# Patient Record
Sex: MALE | Race: BLACK OR AFRICAN AMERICAN | NOT HISPANIC OR LATINO | URBAN - METROPOLITAN AREA
[De-identification: names, ages, dates, MRNs, and addresses within clinical notes are randomized per-mention and may not be internally consistent; named-entity substitution may affect disease eponyms.]

---

## 2019-04-11 ENCOUNTER — EMERGENCY (EMERGENCY)
Facility: HOSPITAL | Age: 24
LOS: 1 days | Discharge: HOME | End: 2019-04-11
Admitting: EMERGENCY MEDICINE
Payer: COMMERCIAL

## 2019-04-11 VITALS
HEART RATE: 76 BPM | RESPIRATION RATE: 18 BRPM | WEIGHT: 244.93 LBS | DIASTOLIC BLOOD PRESSURE: 86 MMHG | SYSTOLIC BLOOD PRESSURE: 144 MMHG | OXYGEN SATURATION: 98 % | TEMPERATURE: 98 F | HEIGHT: 73 IN

## 2019-04-11 PROCEDURE — 73610 X-RAY EXAM OF ANKLE: CPT | Mod: 26,LT

## 2019-04-11 PROCEDURE — 99283 EMERGENCY DEPT VISIT LOW MDM: CPT

## 2019-04-11 PROCEDURE — 99283 EMERGENCY DEPT VISIT LOW MDM: CPT | Mod: 25

## 2019-04-11 PROCEDURE — 73610 X-RAY EXAM OF ANKLE: CPT

## 2019-04-11 NOTE — ED ADULT NURSE NOTE - OBJECTIVE STATEMENT
PT is 23y male presented to ED w/ L ankle pain and swelling s/p tripping and falling over another persons foot while playing basketball.

## 2019-04-11 NOTE — ED ADULT NURSE NOTE - CHPI ED NUR SYMPTOMS NEG
no fever/no deformity/no tingling/no vomiting/no weakness/no abrasion/no numbness/no bleeding/no loss of consciousness

## 2019-04-11 NOTE — ED PROVIDER NOTE - OBJECTIVE STATEMENT
left ankle injury with pain and swelling tonight as landed on another persons foot playing ball and thus to ED -> denies other injury

## 2019-04-11 NOTE — ED PROVIDER NOTE - PHYSICAL EXAMINATION
left ankle with lateral malleolar tenderness , ROM limted by tenderness skin intact compartments soft pulses intact cap refill brisk

## 2019-04-11 NOTE — ED PROVIDER NOTE - DIAGNOSTIC INTERPRETATION
well corticated bony island post lat to talus obscuring possible small distal fib ND Garsia A but in essence Neg for acute fx or dislocation

## 2019-04-11 NOTE — ED PROVIDER NOTE - CARE PLAN
Principal Discharge DX:	Ankle pain, left Principal Discharge DX:	Ankle pain, left  Secondary Diagnosis:	Ankle sprain

## 2019-04-11 NOTE — ED PROVIDER NOTE - PROVIDER TOKENS
PROVIDER:[TOKEN:[[id number]]],PROVIDER:[TOKEN:[[id number]]],PROVIDER:[TOKEN:[[id number]]]

## 2019-04-11 NOTE — ED PROVIDER NOTE - CARE PROVIDER_API CALL
Lb Solitario)  Orthopaedic Surgery  [address] [facility] [address] [address]  [place] [address]  Phone: [phone]  Fax: [phone]  Follow Up Time:     Manav Garvey)  Orthopaedic Surgery  [address] [place] [address]  Phone: [phone]  Fax: [phone]  Follow Up Time:     Edson Melvin (DPM)  Podiatric Medicine and Surgery  [address] [facility], [address]  [place] [address]  Phone: [phone]  Fax: [phone]  Follow Up Time:
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
## 2019-04-15 DIAGNOSIS — M25.572 PAIN IN LEFT ANKLE AND JOINTS OF LEFT FOOT: ICD-10-CM
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
## 2019-04-15 DIAGNOSIS — S93.402A SPRAIN OF UNSPECIFIED LIGAMENT OF LEFT ANKLE, INITIAL ENCOUNTER: ICD-10-CM
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
